# Patient Record
Sex: FEMALE | Race: WHITE | NOT HISPANIC OR LATINO | Employment: FULL TIME | URBAN - METROPOLITAN AREA
[De-identification: names, ages, dates, MRNs, and addresses within clinical notes are randomized per-mention and may not be internally consistent; named-entity substitution may affect disease eponyms.]

---

## 2022-04-29 ENCOUNTER — APPOINTMENT (OUTPATIENT)
Dept: LAB | Facility: MEDICAL CENTER | Age: 62
End: 2022-04-29

## 2024-07-17 ENCOUNTER — APPOINTMENT (OUTPATIENT)
Dept: RADIOLOGY | Facility: MEDICAL CENTER | Age: 64
End: 2024-07-17
Payer: OTHER MISCELLANEOUS

## 2024-07-17 ENCOUNTER — OCCMED (OUTPATIENT)
Dept: URGENT CARE | Facility: MEDICAL CENTER | Age: 64
End: 2024-07-17
Payer: OTHER MISCELLANEOUS

## 2024-07-17 DIAGNOSIS — M25.511 ACUTE PAIN OF RIGHT SHOULDER: Primary | ICD-10-CM

## 2024-07-17 DIAGNOSIS — M25.511 ACUTE PAIN OF RIGHT SHOULDER: ICD-10-CM

## 2024-07-17 PROCEDURE — 73000 X-RAY EXAM OF COLLAR BONE: CPT

## 2024-07-17 PROCEDURE — 99284 EMERGENCY DEPT VISIT MOD MDM: CPT

## 2024-07-17 PROCEDURE — G0383 LEV 4 HOSP TYPE B ED VISIT: HCPCS

## 2024-07-17 PROCEDURE — 73030 X-RAY EXAM OF SHOULDER: CPT

## 2024-07-23 ENCOUNTER — APPOINTMENT (OUTPATIENT)
Dept: URGENT CARE | Facility: MEDICAL CENTER | Age: 64
End: 2024-07-23
Payer: OTHER MISCELLANEOUS

## 2024-07-23 PROCEDURE — 99213 OFFICE O/P EST LOW 20 MIN: CPT

## 2024-07-31 ENCOUNTER — APPOINTMENT (OUTPATIENT)
Dept: URGENT CARE | Facility: MEDICAL CENTER | Age: 64
End: 2024-07-31
Payer: OTHER MISCELLANEOUS

## 2024-07-31 PROCEDURE — 99213 OFFICE O/P EST LOW 20 MIN: CPT

## 2024-08-14 ENCOUNTER — APPOINTMENT (OUTPATIENT)
Dept: URGENT CARE | Facility: MEDICAL CENTER | Age: 64
End: 2024-08-14
Payer: OTHER MISCELLANEOUS

## 2024-08-14 PROCEDURE — 99213 OFFICE O/P EST LOW 20 MIN: CPT

## 2025-02-15 ENCOUNTER — APPOINTMENT (EMERGENCY)
Dept: RADIOLOGY | Facility: HOSPITAL | Age: 65
End: 2025-02-15
Payer: MEDICARE

## 2025-02-15 ENCOUNTER — HOSPITAL ENCOUNTER (EMERGENCY)
Facility: HOSPITAL | Age: 65
Discharge: HOME | End: 2025-02-15
Attending: EMERGENCY MEDICINE
Payer: MEDICARE

## 2025-02-15 VITALS
SYSTOLIC BLOOD PRESSURE: 194 MMHG | HEART RATE: 96 BPM | TEMPERATURE: 98.1 F | RESPIRATION RATE: 18 BRPM | OXYGEN SATURATION: 97 % | DIASTOLIC BLOOD PRESSURE: 100 MMHG

## 2025-02-15 DIAGNOSIS — R07.89 ATYPICAL CHEST PAIN: Primary | ICD-10-CM

## 2025-02-15 LAB
2HR DELTA HS TROPONIN: 0 NG/L
ALBUMIN SERPL BCG-MCNC: 4.7 G/DL (ref 3.5–5)
ALP SERPL-CCNC: 56 U/L (ref 34–104)
ALT SERPL W P-5'-P-CCNC: 14 U/L (ref 7–52)
ANION GAP SERPL CALCULATED.3IONS-SCNC: 12 MMOL/L (ref 4–13)
AST SERPL W P-5'-P-CCNC: 23 U/L (ref 13–39)
ATRIAL RATE: 73 BPM
BASOPHILS # BLD AUTO: 0.03 THOUSANDS/ΜL (ref 0–0.1)
BASOPHILS NFR BLD AUTO: 1 % (ref 0–1)
BILIRUB SERPL-MCNC: 0.4 MG/DL (ref 0.2–1)
BUN SERPL-MCNC: 10 MG/DL (ref 5–25)
CALCIUM SERPL-MCNC: 9.3 MG/DL (ref 8.4–10.2)
CARDIAC TROPONIN I PNL SERPL HS: 4 NG/L (ref ?–50)
CARDIAC TROPONIN I PNL SERPL HS: 4 NG/L (ref ?–50)
CHLORIDE SERPL-SCNC: 104 MMOL/L (ref 96–108)
CO2 SERPL-SCNC: 25 MMOL/L (ref 21–32)
CREAT SERPL-MCNC: 0.64 MG/DL (ref 0.6–1.3)
EOSINOPHIL # BLD AUTO: 0.09 THOUSAND/ΜL (ref 0–0.61)
EOSINOPHIL NFR BLD AUTO: 1 % (ref 0–6)
ERYTHROCYTE [DISTWIDTH] IN BLOOD BY AUTOMATED COUNT: 12.3 % (ref 11.6–15.1)
GFR SERPL CREATININE-BSD FRML MDRD: 93 ML/MIN/1.73SQ M
GLUCOSE SERPL-MCNC: 88 MG/DL (ref 65–140)
HCT VFR BLD AUTO: 41.4 % (ref 34.8–46.1)
HGB BLD-MCNC: 13.7 G/DL (ref 11.5–15.4)
IMM GRANULOCYTES # BLD AUTO: 0.01 THOUSAND/UL (ref 0–0.2)
IMM GRANULOCYTES NFR BLD AUTO: 0 % (ref 0–2)
LIPASE SERPL-CCNC: 21 U/L (ref 11–82)
LYMPHOCYTES # BLD AUTO: 1.46 THOUSANDS/ΜL (ref 0.6–4.47)
LYMPHOCYTES NFR BLD AUTO: 23 % (ref 14–44)
MCH RBC QN AUTO: 31.1 PG (ref 26.8–34.3)
MCHC RBC AUTO-ENTMCNC: 33.1 G/DL (ref 31.4–37.4)
MCV RBC AUTO: 94 FL (ref 82–98)
MONOCYTES # BLD AUTO: 0.51 THOUSAND/ΜL (ref 0.17–1.22)
MONOCYTES NFR BLD AUTO: 8 % (ref 4–12)
NEUTROPHILS # BLD AUTO: 4.35 THOUSANDS/ΜL (ref 1.85–7.62)
NEUTS SEG NFR BLD AUTO: 67 % (ref 43–75)
NRBC BLD AUTO-RTO: 0 /100 WBCS
P AXIS: 73 DEGREES
PLATELET # BLD AUTO: 259 THOUSANDS/UL (ref 149–390)
PMV BLD AUTO: 8.5 FL (ref 8.9–12.7)
POTASSIUM SERPL-SCNC: 3.5 MMOL/L (ref 3.5–5.3)
PR INTERVAL: 172 MS
PROT SERPL-MCNC: 7.8 G/DL (ref 6.4–8.4)
QRS AXIS: 86 DEGREES
QRSD INTERVAL: 84 MS
QT INTERVAL: 394 MS
QTC INTERVAL: 434 MS
RBC # BLD AUTO: 4.4 MILLION/UL (ref 3.81–5.12)
SODIUM SERPL-SCNC: 141 MMOL/L (ref 135–147)
T WAVE AXIS: 53 DEGREES
VENTRICULAR RATE: 73 BPM
WBC # BLD AUTO: 6.45 THOUSAND/UL (ref 4.31–10.16)

## 2025-02-15 PROCEDURE — 36415 COLL VENOUS BLD VENIPUNCTURE: CPT

## 2025-02-15 PROCEDURE — 71046 X-RAY EXAM CHEST 2 VIEWS: CPT

## 2025-02-15 PROCEDURE — 83690 ASSAY OF LIPASE: CPT | Performed by: EMERGENCY MEDICINE

## 2025-02-15 PROCEDURE — 99284 EMERGENCY DEPT VISIT MOD MDM: CPT | Performed by: EMERGENCY MEDICINE

## 2025-02-15 PROCEDURE — 84484 ASSAY OF TROPONIN QUANT: CPT

## 2025-02-15 PROCEDURE — 85025 COMPLETE CBC W/AUTO DIFF WBC: CPT

## 2025-02-15 PROCEDURE — 96374 THER/PROPH/DIAG INJ IV PUSH: CPT

## 2025-02-15 PROCEDURE — 93005 ELECTROCARDIOGRAM TRACING: CPT

## 2025-02-15 PROCEDURE — 80053 COMPREHEN METABOLIC PANEL: CPT

## 2025-02-15 PROCEDURE — 99285 EMERGENCY DEPT VISIT HI MDM: CPT

## 2025-02-15 RX ORDER — MAGNESIUM HYDROXIDE/ALUMINUM HYDROXICE/SIMETHICONE 120; 1200; 1200 MG/30ML; MG/30ML; MG/30ML
30 SUSPENSION ORAL ONCE
Status: COMPLETED | OUTPATIENT
Start: 2025-02-15 | End: 2025-02-15

## 2025-02-15 RX ORDER — PANTOPRAZOLE SODIUM 40 MG/1
40 TABLET, DELAYED RELEASE ORAL 2 TIMES DAILY
Qty: 60 TABLET | Refills: 0 | Status: SHIPPED | OUTPATIENT
Start: 2025-02-15

## 2025-02-15 RX ORDER — PANTOPRAZOLE SODIUM 40 MG/10ML
40 INJECTION, POWDER, LYOPHILIZED, FOR SOLUTION INTRAVENOUS ONCE
Status: COMPLETED | OUTPATIENT
Start: 2025-02-15 | End: 2025-02-15

## 2025-02-15 RX ORDER — SUCRALFATE 1 G/1
1 TABLET ORAL 4 TIMES DAILY
Qty: 120 TABLET | Refills: 0 | Status: SHIPPED | OUTPATIENT
Start: 2025-02-15

## 2025-02-15 RX ADMIN — ALUMINUM HYDROXIDE, MAGNESIUM HYDROXIDE, AND DIMETHICONE 30 ML: 200; 20; 200 SUSPENSION ORAL at 18:29

## 2025-02-15 RX ADMIN — PANTOPRAZOLE SODIUM 40 MG: 40 INJECTION, POWDER, FOR SOLUTION INTRAVENOUS at 18:31

## 2025-02-15 NOTE — DISCHARGE INSTRUCTIONS
You were seen in the ED for chest pain.  You had bloodwork, EKG, chest xrays, all showing no significant abnormalities. Please follow up with your primary care physician and/or cardiologist within the next 1-2 weeks for continued management of your condition.  Take Protonix twice daily as well as Carafate with every meal and before going to sleep.  You have been referred to gastroenterology, please call them Monday morning to schedule an appointment.  Please come back to the ED if your symptoms return or worsen or if you develop uncontrollable pain, shortness of breath.  Thank you very much for utilizing the ED this evening.

## 2025-02-15 NOTE — ED PROVIDER NOTES
Time reflects when diagnosis was documented in both MDM as applicable and the Disposition within this note       Time User Action Codes Description Comment    2/15/2025  6:54 PM Camilo Morin Add [R07.89] Atypical chest pain           ED Disposition       ED Disposition   Discharge    Condition   Stable    Date/Time   Sat Feb 15, 2025  6:54 PM    Comment   Kayla Starr discharge to home/self care.                   Assessment & Plan       Medical Decision Making  This is a 64-year-old female without any significant related past medical history presenting to the ED today for complaint of chest pain.  Her chest pain is substernal, nonradiating, and she does not have any other associated symptoms aside from having some indigestion earlier today.  Patient denies any nausea vomiting, melanotic stools, hematochezia, hematemesis, or any other complaints.  Her exam for the most part is unremarkable.  She does however have significantly elevated blood pressure, which did trend down.  She was quite anxious while here in the ED.  Her differential diagnosis includes: Atypical ACS versus indigestion/gastritis versus costochondritis versus anxiety versus other.  Patient had a CBC, metabolic panel, troponin x 2, EKG, all of which were unremarkable.  Patient received GI cocktail while here in the ED, which did improve her symptoms significantly.  Patient was then cleared for discharge, with instructions to follow-up with her PCP, for blood pressure recheck, and to follow-up with GI.  She will take Protonix twice daily, Carafate until she sees them.  The management plan was discussed in detail with the patient at bedside and all questions were answered. Strict ED return instructions were discussed at bedside. Prior to discharge, both verbal and written instructions were provided. We discussed the signs and symptoms that should prompt the patient to return to the ED. All questions were answered and the patient was comfortable with  "the plan of care and discharged home. The patient agrees to return to the Emergency Department for concerns and/or progression of illness.    Portions of the above record have been created with voice recognition software.  Occasional wrong word or \"sound alike\" substitutions may have occurred due to the inherent limitations of voice recognition software.  Read the chart carefully and recognize, using context, where substitutions may have occurred.    Amount and/or Complexity of Data Reviewed  External Data Reviewed: labs, radiology, ECG and notes.  Labs: ordered.    Risk  OTC drugs.  Prescription drug management.             Medications   pantoprazole (PROTONIX) injection 40 mg (40 mg Intravenous Given 2/15/25 1831)   aluminum-magnesium hydroxide-simethicone (MAALOX) oral suspension 30 mL (30 mL Oral Given 2/15/25 1829)       ED Risk Strat Scores   HEART Risk Score      Flowsheet Row Most Recent Value   Heart Score Risk Calculator    History 0 Filed at: 02/15/2025 1920   ECG 1 Filed at: 02/15/2025 1920   Age 2 Filed at: 02/15/2025 1920   Risk Factors 0 Filed at: 02/15/2025 1920   Troponin 0 Filed at: 02/15/2025 1920   HEART Score 3 Filed at: 02/15/2025 1920          HEART Risk Score      Flowsheet Row Most Recent Value   Heart Score Risk Calculator    History 0 Filed at: 02/15/2025 1920   ECG 1 Filed at: 02/15/2025 1920   Age 2 Filed at: 02/15/2025 1920   Risk Factors 0 Filed at: 02/15/2025 1920   Troponin 0 Filed at: 02/15/2025 1920   HEART Score 3 Filed at: 02/15/2025 1920                              SBIRT 20yo+      Flowsheet Row Most Recent Value   Initial Alcohol Screen: US AUDIT-C     1. How often do you have a drink containing alcohol? 0 Filed at: 02/15/2025 1653   2. How many drinks containing alcohol do you have on a typical day you are drinking?  0 Filed at: 02/15/2025 1653   3a. Male UNDER 65: How often do you have five or more drinks on one occasion? 0 Filed at: 02/15/2025 1653   3b. FEMALE Any Age, or " MALE 65+: How often do you have 4 or more drinks on one occassion? 0 Filed at: 02/15/2025 1653   Audit-C Score 0 Filed at: 02/15/2025 1653   SHAKEEL: How many times in the past year have you...    Used an illegal drug or used a prescription medication for non-medical reasons? Never Filed at: 02/15/2025 1653                            History of Present Illness       Chief Complaint   Patient presents with    Chest Pain     Patient states that she has chest pain that has been present for a few weeks, worsening today. Pressure in center of chest. Noticed a few weeks ago, and again today, that when she drinks something she feels it does not go all the way down and gets stuck between breasts.        History reviewed. No pertinent past medical history.   History reviewed. No pertinent surgical history.   History reviewed. No pertinent family history.       E-Cigarette/Vaping      E-Cigarette/Vaping Substances      I have reviewed and agree with the history as documented.     This is a 64-year-old female without any significant related past medical history presenting to the ED today for a complaint of chest pain.  Chest pain is substernal, midline, without any radiation.  Patient states that her symptoms have been present all day today, since she woke up, and they have been a dull pressure in quality.  She has not had any hematemesis, coffee-ground emesis, melanotic stools, hematochezia, or any other significantly associated symptoms.  Patient states that she has had pain like this before, and has been intermittent however over the past week and today is the first day that it became constant.  Patient does not have any cardiac history, does not have a history of elevated blood pressure, and does not have any other complaints.  Her sons present with her, corroborate her history.  The only other history that she complains of is feeling like food got stuck in her throat earlier today, but it passed after she drank some  water.        Review of Systems   Constitutional:  Negative for activity change, chills and fever.   HENT:  Negative for congestion and rhinorrhea.    Eyes:  Negative for photophobia and visual disturbance.   Respiratory:  Negative for cough, chest tightness and shortness of breath.    Cardiovascular:  Positive for chest pain. Negative for leg swelling.   Gastrointestinal:  Negative for abdominal distention, nausea and vomiting.   Genitourinary:  Negative for dysuria and frequency.   Musculoskeletal:  Negative for back pain and neck stiffness.   Skin:  Negative for rash and wound.   Neurological:  Negative for dizziness and weakness.   Psychiatric/Behavioral:  Negative for agitation and suicidal ideas.            Objective       ED Triage Vitals   Temperature Pulse Blood Pressure Respirations SpO2 Patient Position - Orthostatic VS   02/15/25 1651 02/15/25 1651 02/15/25 1651 02/15/25 1651 02/15/25 1651 02/15/25 1651   98.1 °F (36.7 °C) 88 (!) 207/105 18 100 % Sitting      Temp Source Heart Rate Source BP Location FiO2 (%) Pain Score    02/15/25 1651 02/15/25 1651 02/15/25 1651 -- 02/15/25 1735    Oral Monitor Right arm  5      Vitals      Date and Time Temp Pulse SpO2 Resp BP Pain Score FACES Pain Rating User   02/15/25 1735 -- 96 97 % 18 194/100 5 -- DP   02/15/25 1651 98.1 °F (36.7 °C) 88 100 % 18 207/105 -- -- HR            Physical Exam  Vitals and nursing note reviewed.   Constitutional:       General: She is not in acute distress.     Appearance: Normal appearance. She is normal weight. She is not ill-appearing or toxic-appearing.   HENT:      Head: Normocephalic and atraumatic.      Right Ear: External ear normal.      Left Ear: External ear normal.      Nose: Nose normal. No congestion or rhinorrhea.      Mouth/Throat:      Mouth: Mucous membranes are moist.      Pharynx: Oropharynx is clear. No oropharyngeal exudate.   Eyes:      General: No scleral icterus.     Conjunctiva/sclera: Conjunctivae normal.    Cardiovascular:      Rate and Rhythm: Normal rate and regular rhythm.      Pulses: Normal pulses.      Heart sounds: Normal heart sounds. No murmur heard.     No gallop.   Pulmonary:      Effort: Pulmonary effort is normal. No respiratory distress.      Breath sounds: Normal breath sounds. No stridor. No wheezing or rhonchi.   Chest:      Chest wall: No deformity or tenderness.   Abdominal:      General: Abdomen is flat. Bowel sounds are normal. There is no distension.      Palpations: Abdomen is soft. There is no mass.      Tenderness: There is no abdominal tenderness.   Musculoskeletal:         General: No swelling or tenderness. Normal range of motion.      Cervical back: Normal range of motion and neck supple. No tenderness.      Right lower leg: No edema.      Left lower leg: No edema.   Skin:     General: Skin is warm and dry.      Capillary Refill: Capillary refill takes less than 2 seconds.      Coloration: Skin is not jaundiced.      Findings: No bruising.   Neurological:      General: No focal deficit present.      Mental Status: She is alert and oriented to person, place, and time. Mental status is at baseline.      Sensory: No sensory deficit.      Motor: No weakness.   Psychiatric:         Mood and Affect: Mood normal.         Behavior: Behavior normal.         Thought Content: Thought content normal.         Judgment: Judgment normal.         Results Reviewed       Procedure Component Value Units Date/Time    HS Troponin I 2hr [[id number]]  (Normal) Collected: 02/15/25 1827    Lab Status: Final result Specimen: Blood from Arm, Left Updated: 02/15/25 1856     hs TnI 2hr 4 ng/L      Delta 2hr hsTnI 0 ng/L     HS Troponin I 4hr [[id number]]     Lab Status: No result Specimen: Blood     Lipase [[id number]]  (Normal) Collected: 02/15/25 1827    Lab Status: Final result Specimen: Blood from Arm, Left Updated: 02/15/25 1847     Lipase 21 u/L     HS Troponin 0hr (reflex protocol) [[id number]]  (Normal)  Collected: 02/15/25 1656    Lab Status: Final result Specimen: Blood from Arm, Left Updated: 02/15/25 1727     hs TnI 0hr 4 ng/L     Comprehensive metabolic panel [[id number]] Collected: 02/15/25 1656    Lab Status: Final result Specimen: Blood from Arm, Left Updated: 02/15/25 1720     Sodium 141 mmol/L      Potassium 3.5 mmol/L      Chloride 104 mmol/L      CO2 25 mmol/L      ANION GAP 12 mmol/L      BUN 10 mg/dL      Creatinine 0.64 mg/dL      Glucose 88 mg/dL      Calcium 9.3 mg/dL      AST 23 U/L      ALT 14 U/L      Alkaline Phosphatase 56 U/L      Total Protein 7.8 g/dL      Albumin 4.7 g/dL      Total Bilirubin 0.40 mg/dL      eGFR 93 ml/min/1.73sq m     Narrative:      National Kid[employer] Foundation guidelines for Chronic Kidney Disease (CKD):     Stage 1 with normal or high GFR (GFR > 90 mL/min/1.73 square meters)    Stage 2 Mild CKD (GFR = 60-89 mL/min/1.73 square meters)    Stage 3A Moderate CKD (GFR = 45-59 mL/min/1.73 square meters)    Stage 3B Moderate CKD (GFR = 30-44 mL/min/1.73 square meters)    Stage 4 Severe CKD (GFR = 15-29 mL/min/1.73 square meters)    Stage 5 End Stage CKD (GFR <15 mL/min/1.73 square meters)  Note: GFR calculation is accurate only with a steady state creatinine    CBC and differential [[id number]]  (Abnormal) Collected: 02/15/25 1656    Lab Status: Final result Specimen: Blood from Arm, Left Updated: 02/15/25 1706     WBC 6.45 Thousand/uL      RBC 4.40 Million/uL      Hemoglobin 13.7 g/dL      Hematocrit 41.4 %      MCV 94 fL      MCH 31.1 pg      MCHC 33.1 g/dL      RDW 12.3 %      MPV 8.5 fL      Platelets 259 Thousands/uL      nRBC 0 /100 WBCs      Segmented % 67 %      Immature Grans % 0 %      Lymphocytes % 23 %      Monocytes % 8 %      Eosinophils Relative 1 %      Basophils Relative 1 %      Absolute Neutrophils 4.35 Thousands/µL      Absolute Immature Grans 0.01 Thousand/uL      Absolute Lymphocytes 1.46 Thousands/µL      Absolute Monocytes 0.51 Thousand/µL       Eosinophils Absolute 0.09 Thousand/µL      Basophils Absolute 0.03 Thousands/µL             XR chest 2 views    (Results Pending)       Procedures    ED Medication and Procedure Management   None     Discharge Medication List as of 2/15/2025  6:57 PM        START taking these medications    Details   pantoprazole (PROTONIX) 40 mg tablet Take 1 tablet (40 mg total) by mouth 2 (two) times a day, Starting Sat 2/15/2025, Normal      sucralfate (CARAFATE) 1 g tablet Take 1 tablet (1 g total) by mouth 4 (four) times a day, Starting Sat 2/15/2025, Normal             ED SEPSIS DOCUMENTATION   Time reflects when diagnosis was documented in both MDM as applicable and the Disposition within this note       Time User Action Codes Description Comment    2/15/2025  6:54 PM Camilo Morni Add [R07.89] Atypical chest pain                  Camilo Morin MD  02/15/25 1922

## 2025-02-17 LAB
ATRIAL RATE: 73 BPM
P AXIS: 73 DEGREES
PR INTERVAL: 172 MS
QRS AXIS: 86 DEGREES
QRSD INTERVAL: 84 MS
QT INTERVAL: 394 MS
QTC INTERVAL: 434 MS
T WAVE AXIS: 53 DEGREES
VENTRICULAR RATE: 73 BPM

## 2025-02-17 PROCEDURE — 93010 ELECTROCARDIOGRAM REPORT: CPT | Performed by: INTERNAL MEDICINE

## 2025-06-03 ENCOUNTER — OFFICE VISIT (OUTPATIENT)
Dept: GASTROENTEROLOGY | Facility: AMBULARY SURGERY CENTER | Age: 65
End: 2025-06-03
Payer: MEDICARE

## 2025-06-03 VITALS
HEART RATE: 87 BPM | WEIGHT: 115.4 LBS | BODY MASS INDEX: 20.45 KG/M2 | HEIGHT: 63 IN | SYSTOLIC BLOOD PRESSURE: 128 MMHG | OXYGEN SATURATION: 98 % | DIASTOLIC BLOOD PRESSURE: 78 MMHG

## 2025-06-03 DIAGNOSIS — R13.19 ESOPHAGEAL DYSPHAGIA: Primary | ICD-10-CM

## 2025-06-03 DIAGNOSIS — Z12.11 SCREENING FOR COLON CANCER: ICD-10-CM

## 2025-06-03 PROCEDURE — 99204 OFFICE O/P NEW MOD 45 MIN: CPT | Performed by: PHYSICIAN ASSISTANT

## 2025-06-03 RX ORDER — SODIUM CHLORIDE, SODIUM LACTATE, POTASSIUM CHLORIDE, CALCIUM CHLORIDE 600; 310; 30; 20 MG/100ML; MG/100ML; MG/100ML; MG/100ML
125 INJECTION, SOLUTION INTRAVENOUS CONTINUOUS
OUTPATIENT
Start: 2025-06-03

## 2025-06-03 NOTE — PROGRESS NOTES
Name: Kayla Starr      : 1960      MRN: [id number]  Encounter Provider: Mary Neumann PA-C  Encounter Date: 6/3/2025   Encounter department: [facility]  :  Assessment & Plan  Esophageal dysphagia  Has in ED in February for atypical chest pain, upon discussion patient reports that it feels like liquids and solids get stuck and causes discomfort and pressure and then they eventually pass, no reflux, no N/V, no regurgitation. Occurs intermittently, not with every meal or daily. Differential includes reflux, esophagitis, EoE, web or stricture, motility issue. Patient tried PPI and carafate for 1 week with no improvement so stopped  -discussed with patient, she would prefer to have endoscopy and look for answers as to what is causing the issue prior to being on medication so will hold off on resuming PPI for now, she denies cough, hoarseness, reflux symptoms.   -recommend EGD to further assess  -discussed procedure, risks including perforation, infection, and bleeding, missing a diagnosis, and benefits in detail with patient   -if EGD normal, would recommend barium swallow +/- manometry  -no med holds  Orders:    EGD; Future    Screening for colon cancer  -last colonoscopy was around 8-10 years ago per patient at [facility], we do not have these records, she denies hx of polyps or family hx colon cancer  -will plan for screening colonoscopy at same time as EGD.   -discussed procedure, risks including perforation, infection, and bleeding, missing a diagnosis, and benefits in detail with patient   -miralax/dulcolax prep  -no med holds   Orders:    Colonoscopy; Future        History of Present Illness   Chest Pain   Pertinent negatives include no abdominal pain, back pain, cough, dizziness, fever, nausea, palpitations, shortness of breath or vomiting.     Kayla Starr is a 64 y.o. female with no significant past medical history presents for a new patient  visit for atypical chest pain.  Patient was seen for this in the ER back in February and was thought this could be secondary to reflux and placed on PPI twice daily and Carafate 4 times daily which she took for 1 week without any change in symptoms.  Upon further discussion today however it sound like the patient is having more of an issue with esophageal dysphagia.  She reports that both solids and liquids feel like they get stuck and she will get pressure and discomfort there until it finally passes.  Denies any regurgitation of food.  Denies any reflux symptoms.  Denies any cough or hoarseness of voice.  Denies any nausea or vomiting.  Denies any abdominal pain.  Reports regular bowel habits without any diarrhea, constipation, blood in the stool, black tarry stools.  Her last colonoscopy was somewhere around 8 to 10 years ago at [facility] but we do not have these records.  Patient denies any personal history of colon polyps or any family history of colon cancer.  She denies any NSAID use.  Occasionally has a glass of wine socially, denies any tobacco use.  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      Review of Systems   Constitutional:  Negative for activity change, appetite change, fever and unexpected weight change.   HENT:  Positive for trouble swallowing. Negative for drooling, mouth sores, sore throat and voice change.    Eyes:  Negative for pain, discharge and visual disturbance.   Respiratory:  Negative for cough, choking, chest tightness and shortness of breath.    Cardiovascular:  Positive for chest pain. Negative for palpitations and leg swelling.   Gastrointestinal:  Negative for abdominal distention, abdominal pain, anal bleeding, blood in stool, constipation, diarrhea, nausea, rectal pain and vomiting.   Genitourinary:  Negative for decreased urine volume, difficulty urinating, dysuria, flank pain and urgency.   Musculoskeletal:  Negative for arthralgias, back pain,  "gait problem, myalgias and neck stiffness.   Skin:  Negative for color change, pallor and rash.   Neurological:  Negative for dizziness, syncope and light-headedness.   Hematological:  Negative for adenopathy.   Psychiatric/Behavioral:  Negative for confusion. The patient is not nervous/anxious.           Objective   /78 (BP Location: Left arm, Patient Position: Sitting, Cuff Size: Standard)   Pulse 87   Ht 5' 3\" (1.6 m)   Wt 52.3 kg (115 lb 6.4 oz)   SpO2 98%   BMI 20.44 kg/m²      Physical Exam  Constitutional:       General: She is not in acute distress.     Appearance: Normal appearance. She is well-developed.   HENT:      Head: Normocephalic and atraumatic.      Mouth/Throat:      Mouth: Mucous membranes are moist.     Eyes:      General: No scleral icterus.    Neck:      Trachea: No tracheal deviation.     Cardiovascular:      Rate and Rhythm: Normal rate and regular rhythm.      Heart sounds: Normal heart sounds. No murmur heard.  Pulmonary:      Effort: Pulmonary effort is normal. No respiratory distress.      Breath sounds: Normal breath sounds. No wheezing or rales.   Abdominal:      General: Bowel sounds are normal. There is no distension.      Palpations: Abdomen is soft. There is no mass.      Tenderness: There is no abdominal tenderness. There is no guarding or rebound.     Musculoskeletal:         General: Normal range of motion.      Cervical back: Normal range of motion and neck supple.     Skin:     General: Skin is warm and dry.      Coloration: Skin is not pale.      Findings: No rash.     Neurological:      Mental Status: She is alert and oriented to person, place, and time. Mental status is at baseline.     Psychiatric:         Mood and Affect: Mood normal.         Behavior: Behavior normal.           "

## 2025-06-03 NOTE — PATIENT INSTRUCTIONS
Scheduled date of EGD/colonoscopy (as of today): 7/22/25  Physician performing EGD/colonoscopy: Dr. pisano  Location of EGD/colonoscopy: [facility]  Desired bowel prep reviewed with patient: marek/maegan  Instructions reviewed with patient by: ness miller  Clearances:  n/a

## 2025-07-08 ENCOUNTER — ANESTHESIA EVENT (OUTPATIENT)
Dept: ANESTHESIOLOGY | Facility: HOSPITAL | Age: 65
End: 2025-07-08

## 2025-07-08 ENCOUNTER — ANESTHESIA (OUTPATIENT)
Dept: ANESTHESIOLOGY | Facility: HOSPITAL | Age: 65
End: 2025-07-08

## 2025-07-17 ENCOUNTER — APPOINTMENT (EMERGENCY)
Dept: RADIOLOGY | Facility: HOSPITAL | Age: 65
End: 2025-07-17
Payer: OTHER MISCELLANEOUS

## 2025-07-17 ENCOUNTER — HOSPITAL ENCOUNTER (EMERGENCY)
Facility: HOSPITAL | Age: 65
Discharge: HOME | End: 2025-07-17
Attending: EMERGENCY MEDICINE
Payer: OTHER MISCELLANEOUS

## 2025-07-17 VITALS
OXYGEN SATURATION: 98 % | HEART RATE: 93 BPM | SYSTOLIC BLOOD PRESSURE: 191 MMHG | TEMPERATURE: 98 F | DIASTOLIC BLOOD PRESSURE: 94 MMHG | RESPIRATION RATE: 18 BRPM

## 2025-07-17 DIAGNOSIS — S20.212A RIB CONTUSION, LEFT, INITIAL ENCOUNTER: Primary | ICD-10-CM

## 2025-07-17 PROCEDURE — 99284 EMERGENCY DEPT VISIT MOD MDM: CPT | Performed by: EMERGENCY MEDICINE

## 2025-07-17 PROCEDURE — 93005 ELECTROCARDIOGRAM TRACING: CPT

## 2025-07-17 PROCEDURE — 99283 EMERGENCY DEPT VISIT LOW MDM: CPT

## 2025-07-17 PROCEDURE — 71101 X-RAY EXAM UNILAT RIBS/CHEST: CPT

## 2025-07-17 RX ORDER — ACETAMINOPHEN 325 MG/1
975 TABLET ORAL ONCE
Status: COMPLETED | OUTPATIENT
Start: 2025-07-17 | End: 2025-07-17

## 2025-07-17 RX ORDER — LIDOCAINE 50 MG/G
1 PATCH TOPICAL ONCE
Status: DISCONTINUED | OUTPATIENT
Start: 2025-07-17 | End: 2025-07-17 | Stop reason: HOSPADM

## 2025-07-17 RX ORDER — IBUPROFEN 600 MG/1
600 TABLET, FILM COATED ORAL ONCE
Status: COMPLETED | OUTPATIENT
Start: 2025-07-17 | End: 2025-07-17

## 2025-07-17 RX ADMIN — IBUPROFEN 600 MG: 600 TABLET, FILM COATED ORAL at 09:37

## 2025-07-17 RX ADMIN — ACETAMINOPHEN 975 MG: 325 TABLET ORAL at 09:37

## 2025-07-17 RX ADMIN — LIDOCAINE 1 PATCH: 700 PATCH TOPICAL at 09:37

## 2025-07-17 NOTE — DISCHARGE INSTRUCTIONS
Please use ibuprofen and Tylenol for pain as needed.  You can use lidocaine patch 12 hours on 12 hours off, can buy it over-the-counter at any pharmacy

## 2025-07-17 NOTE — Clinical Note
Kayla Matty was seen and treated in our emergency department on 7/17/2025.    Occupational Medicine Follow Up Within 24 hours            Diagnosis:     Kayla  .    She may return on this date:          If you have any questions or concerns, please don't hesitate to call.      Silvia Alberto MD    ______________________________           _______________          _______________  Hospital Representative                              Date                                Time

## 2025-07-18 ENCOUNTER — OCCMED (OUTPATIENT)
Dept: URGENT CARE | Age: 65
End: 2025-07-18
Payer: OTHER MISCELLANEOUS

## 2025-07-18 DIAGNOSIS — R07.81 RIB PAIN ON LEFT SIDE: Primary | ICD-10-CM

## 2025-07-18 LAB
ATRIAL RATE: 80 BPM
P AXIS: 73 DEGREES
PR INTERVAL: 182 MS
QRS AXIS: 79 DEGREES
QRSD INTERVAL: 96 MS
QT INTERVAL: 390 MS
QTC INTERVAL: 449 MS
T WAVE AXIS: 64 DEGREES
VENTRICULAR RATE: 80 BPM

## 2025-07-18 PROCEDURE — 93010 ELECTROCARDIOGRAM REPORT: CPT | Performed by: INTERNAL MEDICINE

## 2025-07-18 PROCEDURE — 99213 OFFICE O/P EST LOW 20 MIN: CPT | Performed by: NURSE PRACTITIONER

## 2025-07-18 NOTE — ED PROVIDER NOTES
Time reflects when diagnosis was documented in both MDM as applicable and the Disposition within this note       Time User Action Codes Description Comment    7/17/2025 10:42 AM Silvia Alberto Add [S20.212A] Rib contusion, left, initial encounter           ED Disposition       ED Disposition   Discharge    Condition   Stable    Date/Time   Thu Jul 17, 2025 10:42 AM    Comment   Kayla Starr discharge to home/self care.                   Assessment & Plan       Medical Decision Making  Rib bruise versus fracture versus pneumothorax.  Will get chest x-ray rib series.  Screening EKG.    Patient improved with symptomatic treatment.  Reviewed x-ray, did not see any obvious fractures or pneumothorax.  Appropriate outpatient occu med follow-up.    Amount and/or Complexity of Data Reviewed  Radiology: ordered and independent interpretation performed.    Risk  OTC drugs.  Prescription drug management.        ED Course as of 07/18/25 1836   Thu Jul 17, 2025   0947 ECG shows rate of 80, sinus, normal axis, normal QRS, no significant ST or T wave changes, normal intervals, independently interpreted by me       Medications   acetaminophen (TYLENOL) tablet 975 mg (975 mg Oral Given 7/17/25 0937)   ibuprofen (MOTRIN) tablet 600 mg (600 mg Oral Given 7/17/25 0937)       ED Risk Strat Scores                    No data recorded                            History of Present Illness       Chief Complaint   Patient presents with    Rib Pain     Left rib pain after hurting them at work. No fall with head strike. No thinners, no asa       Past Medical History[1]   Past Surgical History[2]   Family History[3]   Social History[4]   E-Cigarette/Vaping    E-Cigarette Use Never User       E-Cigarette/Vaping Substances      I have reviewed and agree with the history as documented.     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  No pain at rest.  Pain with any movement.  No central chest pain.  No diaphoresis.  No shortness of breath.  No fevers or chills.  Did not fall to the ground.  Not on blood thinners.      History provided by:  Patient   used: No        Review of Systems   Constitutional:  Negative for chills, diaphoresis and fever.   HENT:  Negative for congestion and sore throat.    Respiratory:  Negative for cough, shortness of breath, wheezing and stridor.    Cardiovascular:  Negative for chest pain, palpitations and leg swelling.   Gastrointestinal:  Negative for abdominal pain, blood in stool, diarrhea, nausea and vomiting.   Genitourinary:  Negative for dysuria, frequency and urgency.   Musculoskeletal:  Negative for neck pain and neck stiffness.   Skin:  Negative for pallor and rash.   Neurological:  Negative for dizziness, syncope, weakness, light-headedness and headaches.   All other systems reviewed and are negative.          Objective       ED Triage Vitals   Temperature Pulse Blood Pressure Respirations SpO2 Patient Position - Orthostatic VS   07/17/25 0914 07/17/25 0914 07/17/25 0914 07/17/25 0914 07/17/25 0914 07/17/25 0914   98 °F (36.7 °C) 93 (!) 191/94 18 98 % Sitting      Temp Source Heart Rate Source BP Location FiO2 (%) Pain Score    07/17/25 0914 07/17/25 0914 07/17/25 0914 -- 07/17/25 0937    Oral Monitor Right arm  No Pain      Vitals      Date and Time Temp Pulse SpO2 Resp BP Pain Score FACES Pain Rating User   07/17/25 0937 -- -- -- -- -- No Pain -- LD   07/17/25 0914 98 °F (36.7 °C) 93 98 % 18 191/94 -- -- SG            Physical Exam  Vitals reviewed.   Constitutional:       Appearance: Normal appearance. She is well-developed.   HENT:      Head: Normocephalic and atraumatic.     Eyes:      Extraocular Movements: Extraocular movements intact.      Pupils: Pupils are equal, round, and reactive to light.       Cardiovascular:      Rate and Rhythm:  Normal rate and regular rhythm.      Heart sounds: Normal heart sounds.   Pulmonary:      Effort: Pulmonary effort is normal. No respiratory distress.      Breath sounds: Normal breath sounds.   Abdominal:      General: Bowel sounds are normal.      Palpations: Abdomen is soft.      Tenderness: There is no abdominal tenderness.     Musculoskeletal:         General: Tenderness present. No swelling. Normal range of motion.      Cervical back: Normal range of motion and neck supple.      Comments: Tenderness over left lower rib cage     Skin:     General: Skin is warm and dry.      Capillary Refill: Capillary refill takes less than 2 seconds.     Neurological:      General: No focal deficit present.      Mental Status: She is alert and oriented to person, place, and time.         Results Reviewed       None            XR ribs with pa chest min 3 views LEFT   ED Interpretation by Silvia Alberto MD (07/17 1020)   No pneumothorax, no obvious rib fracture      Final Interpretation by Gomez Vallecillo MD (07/18 0126)      No acute displaced rib fracture.      No acute cardiopulmonary disease.         Computerized Assisted Algorithm (CAA) may have been used to analyze all applicable images.         Workstation performed: [id number]             Procedures    ED Medication and Procedure Management   Prior to Admission Medications   Prescriptions Last Dose Informant Patient Reported? Taking?   Multiple Vitamin (MULTIVITAMIN ADULT PO)  Self Yes No   Sig: Take 1 tablet by mouth in the morning   pantoprazole (PROTONIX) 40 mg tablet  Self No No   Sig: Take 1 tablet (40 mg total) by mouth 2 (two) times a day   Patient not taking: Reported on 6/3/2025   sucralfate (CARAFATE) 1 g tablet  Self No No   Sig: Take 1 tablet (1 g total) by mouth 4 (four) times a day   Patient not taking: Reported on 6/3/2025      Facility-Administered Medications: None     Discharge Medication List as of 7/17/2025 10:46 AM        CONTINUE these  medications which have NOT CHANGED    Details   Multiple Vitamin (MULTIVITAMIN ADULT PO) Take 1 tablet by mouth in the morning, Historical Med      pantoprazole (PROTONIX) 40 mg tablet Take 1 tablet (40 mg total) by mouth 2 (two) times a day, Starting Sat 2/15/2025, Normal      sucralfate (CARAFATE) 1 g tablet Take 1 tablet (1 g total) by mouth 4 (four) times a day, Starting Sat 2/15/2025, Normal             ED SEPSIS DOCUMENTATION   Time reflects when diagnosis was documented in both MDM as applicable and the Disposition within this note       Time User Action Codes Description Comment    7/17/2025 10:42 AM Silvia Alberto Add [S20.212A] Rib contusion, left, initial encounter                    [1] No past medical history on file.  [2] No past surgical history on file.  [3] No family history on file.  [4]   Social History  Tobacco Use    Smoking status: Never    Smokeless tobacco: Never   Vaping Use    Vaping status: Never Used   Substance Use Topics    Alcohol use: Never    Drug use: Never        Silvia Alberto MD  07/18/25 1999

## 2025-07-22 ENCOUNTER — ANESTHESIA (OUTPATIENT)
Dept: GASTROENTEROLOGY | Facility: AMBULARY SURGERY CENTER | Age: 65
End: 2025-07-22
Payer: MEDICARE

## 2025-07-22 ENCOUNTER — HOSPITAL ENCOUNTER (OUTPATIENT)
Dept: GASTROENTEROLOGY | Facility: AMBULARY SURGERY CENTER | Age: 65
Setting detail: OUTPATIENT SURGERY
Discharge: HOME | End: 2025-07-22
Attending: PHYSICIAN ASSISTANT
Payer: MEDICARE

## 2025-07-22 VITALS
OXYGEN SATURATION: 99 % | TEMPERATURE: 96.9 F | RESPIRATION RATE: 16 BRPM | DIASTOLIC BLOOD PRESSURE: 72 MMHG | BODY MASS INDEX: 19.88 KG/M2 | HEIGHT: 62 IN | HEART RATE: 68 BPM | WEIGHT: 108 LBS | SYSTOLIC BLOOD PRESSURE: 140 MMHG

## 2025-07-22 DIAGNOSIS — Z12.11 SCREENING FOR COLON CANCER: ICD-10-CM

## 2025-07-22 DIAGNOSIS — R07.89 ATYPICAL CHEST PAIN: ICD-10-CM

## 2025-07-22 DIAGNOSIS — Z86.0100 HISTORY OF COLON POLYPS: ICD-10-CM

## 2025-07-22 DIAGNOSIS — R07.89 CHEST PAIN, ATYPICAL: ICD-10-CM

## 2025-07-22 DIAGNOSIS — R13.19 ESOPHAGEAL DYSPHAGIA: ICD-10-CM

## 2025-07-22 PROBLEM — K21.9 GASTROESOPHAGEAL REFLUX DISEASE: Status: ACTIVE | Noted: 2025-07-22

## 2025-07-22 PROCEDURE — 88342 IMHCHEM/IMCYTCHM 1ST ANTB: CPT | Performed by: PATHOLOGY

## 2025-07-22 PROCEDURE — 88305 TISSUE EXAM BY PATHOLOGIST: CPT | Performed by: PATHOLOGY

## 2025-07-22 RX ORDER — PANTOPRAZOLE SODIUM 40 MG/1
40 TABLET, DELAYED RELEASE ORAL DAILY
Qty: 90 TABLET | OUTPATIENT
Start: 2025-07-22

## 2025-07-22 RX ORDER — PANTOPRAZOLE SODIUM 40 MG/1
40 TABLET, DELAYED RELEASE ORAL DAILY
Qty: 30 TABLET | Refills: 11 | Status: SHIPPED | OUTPATIENT
Start: 2025-07-22

## 2025-07-22 RX ORDER — PROPOFOL 10 MG/ML
INJECTION, EMULSION INTRAVENOUS AS NEEDED
Status: DISCONTINUED | OUTPATIENT
Start: 2025-07-22 | End: 2025-07-22

## 2025-07-22 RX ORDER — SODIUM CHLORIDE, SODIUM LACTATE, POTASSIUM CHLORIDE, CALCIUM CHLORIDE 600; 310; 30; 20 MG/100ML; MG/100ML; MG/100ML; MG/100ML
INJECTION, SOLUTION INTRAVENOUS CONTINUOUS PRN
Status: DISCONTINUED | OUTPATIENT
Start: 2025-07-22 | End: 2025-07-22

## 2025-07-22 RX ADMIN — SODIUM CHLORIDE, SODIUM LACTATE, POTASSIUM CHLORIDE, AND CALCIUM CHLORIDE: .6; .31; .03; .02 INJECTION, SOLUTION INTRAVENOUS at 07:00

## 2025-07-22 RX ADMIN — PROPOFOL 50 MG: 10 INJECTION, EMULSION INTRAVENOUS at 07:46

## 2025-07-22 RX ADMIN — PROPOFOL 50 MG: 10 INJECTION, EMULSION INTRAVENOUS at 07:38

## 2025-07-22 RX ADMIN — Medication 40 MG: at 07:48

## 2025-07-22 RX ADMIN — PROPOFOL 150 MG: 10 INJECTION, EMULSION INTRAVENOUS at 07:34

## 2025-07-22 RX ADMIN — PROPOFOL 50 MG: 10 INJECTION, EMULSION INTRAVENOUS at 07:36

## 2025-07-22 RX ADMIN — PROPOFOL 50 MG: 10 INJECTION, EMULSION INTRAVENOUS at 07:51

## 2025-07-22 RX ADMIN — PROPOFOL 50 MG: 10 INJECTION, EMULSION INTRAVENOUS at 07:43

## 2025-07-22 RX ADMIN — PROPOFOL 50 MG: 10 INJECTION, EMULSION INTRAVENOUS at 07:40

## 2025-07-22 NOTE — H&P
"History and Physical -  [facility]  Kayla Starr 64 y.o. female MRN: [id number]        HPI: 64-year-old female reports having issues with pain in the lower chest.  Denies any difficulty swallowing.  Regular bowel movements.  She reports having had a colonoscopy 6 years ago and had couple of polyps removed.    Historical Information   Past Medical History[1]  Past Surgical History[2]  Social History   Social History     Substance and Sexual Activity   Alcohol Use Never     Social History     Substance and Sexual Activity   Drug Use Never     Tobacco Use History[3]  Family History[4]    Meds/Allergies     Not in a hospital admission.    Allergies[5]    Objective     Blood pressure (!) 147/103, pulse 86, temperature (!) 97.2 °F (36.2 °C), temperature source Temporal, resp. rate 16, height 5' 2\" (1.575 m), weight 49 kg (108 lb), SpO2 99%.    Physical Exam:    Chest- CTA  Heart- RRR  Abdomen- NT/ND  Extremities- No edema    ASSESSMENT:     Atypical chest pain, history of polyps    PLAN:    EGD and colonoscopy                   [1]   Past Medical History:  Diagnosis Date    Colon polyp    [2]   Past Surgical History:  Procedure Laterality Date    COLONOSCOPY     [3]   Social History  Tobacco Use   Smoking Status Never   Smokeless Tobacco Never   [4] No family history on file.  [5] No Known Allergies    "

## 2025-07-22 NOTE — ANESTHESIA PREPROCEDURE EVALUATION
Procedure:  COLONOSCOPY  EGD    Relevant Problems   ANESTHESIA (within normal limits)      CARDIO (within normal limits)   (-) Angina at rest (HCC)   (-) Angina of effort (HCC)      ENDO (within normal limits)      GI/HEPATIC   (+) Gastroesophageal reflux disease (Well controlled)      PULMONARY   (-) URI (upper respiratory infection)        Physical Exam    Airway     Mallampati score: III  TM Distance: >3 FB  Neck ROM: full      Cardiovascular  Rhythm: regular, Rate: normal    Dental   No notable dental hx     Pulmonary   Breath sounds clear to auscultation    Neurological    She appears awake, alert and oriented x3.      Other Findings  post-pubertal.      Anesthesia Plan  ASA Score- 1     Anesthesia Type- IV sedation with anesthesia with ASA Monitors.         Additional Monitors:     Airway Plan: natural airway.           Plan Factors-Exercise tolerance (METS): >4 METS.    Chart reviewed.        Patient is not a current smoker.      Obstructive sleep apnea risk education given perioperatively.        Induction- intravenous.    Postoperative Plan- .   Monitoring Plan - Monitoring plan - standard ASA monitoring      Perioperative Resuscitation Plan - Level 1 - Full Code.       Informed Consent- Anesthetic plan and risks discussed with patient.  I personally reviewed this patient with the CRNA. Discussed and agreed on the Anesthesia Plan with the CRNA..      NPO Status:  Vitals Value Taken Time   Date of last liquid 07/21/25 07/22/25 06:53   Time of last liquid 1930 07/22/25 06:53   Date of last solid 07/20/25 07/22/25 06:53   Time of last solid 2100 07/22/25 06:53

## 2025-07-22 NOTE — ANESTHESIA POSTPROCEDURE EVALUATION
Post-Op Assessment Note    CV Status:  Stable  Pain Score: 0    Pain management: adequate       Mental Status:  Alert and awake   Hydration Status:  Euvolemic   PONV Controlled:  Controlled   Airway Patency:  Patent     Post Op Vitals Reviewed: Yes    No anethesia notable event occurred.    Staff: CRNA           Last Filed PACU Vitals:  Vitals Value Taken Time   Temp Rn temp    Pulse 64    /69    Resp 16    SpO2 96% 6Lo2 mask

## 2025-07-29 PROCEDURE — 88342 IMHCHEM/IMCYTCHM 1ST ANTB: CPT | Performed by: PATHOLOGY

## 2025-07-29 PROCEDURE — 88305 TISSUE EXAM BY PATHOLOGIST: CPT | Performed by: PATHOLOGY
